# Patient Record
Sex: MALE | Race: WHITE | Employment: FULL TIME | URBAN - METROPOLITAN AREA
[De-identification: names, ages, dates, MRNs, and addresses within clinical notes are randomized per-mention and may not be internally consistent; named-entity substitution may affect disease eponyms.]

---

## 2019-06-23 ENCOUNTER — HOSPITAL ENCOUNTER (EMERGENCY)
Facility: CLINIC | Age: 54
Discharge: HOME | End: 2019-06-23
Attending: EMERGENCY MEDICINE | Admitting: EMERGENCY MEDICINE
Payer: COMMERCIAL

## 2019-06-23 ENCOUNTER — APPOINTMENT (OUTPATIENT)
Dept: CT IMAGING | Facility: CLINIC | Age: 54
End: 2019-06-23
Attending: EMERGENCY MEDICINE
Payer: COMMERCIAL

## 2019-06-23 ENCOUNTER — APPOINTMENT (OUTPATIENT)
Dept: GENERAL RADIOLOGY | Facility: CLINIC | Age: 54
End: 2019-06-23
Attending: EMERGENCY MEDICINE
Payer: COMMERCIAL

## 2019-06-23 VITALS
HEART RATE: 71 BPM | OXYGEN SATURATION: 96 % | WEIGHT: 203.48 LBS | RESPIRATION RATE: 18 BRPM | TEMPERATURE: 98.5 F | BODY MASS INDEX: 28.38 KG/M2 | SYSTOLIC BLOOD PRESSURE: 129 MMHG | DIASTOLIC BLOOD PRESSURE: 86 MMHG

## 2019-06-23 DIAGNOSIS — R06.02 SOB (SHORTNESS OF BREATH): ICD-10-CM

## 2019-06-23 LAB
ANION GAP SERPL CALCULATED.3IONS-SCNC: 5 MMOL/L (ref 3–14)
BASOPHILS # BLD AUTO: 0 10E9/L (ref 0–0.2)
BASOPHILS NFR BLD AUTO: 0.7 %
BUN SERPL-MCNC: 23 MG/DL (ref 7–30)
CALCIUM SERPL-MCNC: 9 MG/DL (ref 8.5–10.1)
CHLORIDE SERPL-SCNC: 107 MMOL/L (ref 94–109)
CO2 BLD-SCNC: 26 MMOL/L (ref 21–28)
CO2 SERPL-SCNC: 27 MMOL/L (ref 20–32)
CREAT SERPL-MCNC: 0.85 MG/DL (ref 0.66–1.25)
D DIMER PPP FEU-MCNC: 0.6 UG/ML FEU (ref 0–0.5)
DIFFERENTIAL METHOD BLD: NORMAL
EOSINOPHIL # BLD AUTO: 0.1 10E9/L (ref 0–0.7)
EOSINOPHIL NFR BLD AUTO: 2.4 %
ERYTHROCYTE [DISTWIDTH] IN BLOOD BY AUTOMATED COUNT: 12.2 % (ref 10–15)
GFR SERPL CREATININE-BSD FRML MDRD: >90 ML/MIN/{1.73_M2}
GLUCOSE SERPL-MCNC: 112 MG/DL (ref 70–99)
HCT VFR BLD AUTO: 43.2 % (ref 40–53)
HGB BLD-MCNC: 14.5 G/DL (ref 13.3–17.7)
IMM GRANULOCYTES # BLD: 0 10E9/L (ref 0–0.4)
IMM GRANULOCYTES NFR BLD: 0.2 %
LACTATE BLD-SCNC: 1 MMOL/L (ref 0.7–2.1)
LYMPHOCYTES # BLD AUTO: 1.6 10E9/L (ref 0.8–5.3)
LYMPHOCYTES NFR BLD AUTO: 27.6 %
MAGNESIUM SERPL-MCNC: 1.9 MG/DL (ref 1.6–2.3)
MCH RBC QN AUTO: 30 PG (ref 26.5–33)
MCHC RBC AUTO-ENTMCNC: 33.6 G/DL (ref 31.5–36.5)
MCV RBC AUTO: 89 FL (ref 78–100)
MONOCYTES # BLD AUTO: 0.4 10E9/L (ref 0–1.3)
MONOCYTES NFR BLD AUTO: 6.9 %
NEUTROPHILS # BLD AUTO: 3.6 10E9/L (ref 1.6–8.3)
NEUTROPHILS NFR BLD AUTO: 62.2 %
NRBC # BLD AUTO: 0 10*3/UL
NRBC BLD AUTO-RTO: 0 /100
NT-PROBNP SERPL-MCNC: 17 PG/ML (ref 0–900)
PCO2 BLD: 43 MM HG (ref 35–45)
PH BLD: 7.38 PH (ref 7.35–7.45)
PLATELET # BLD AUTO: 204 10E9/L (ref 150–450)
PO2 BLD: 38 MM HG (ref 80–105)
POTASSIUM SERPL-SCNC: 4 MMOL/L (ref 3.4–5.3)
RBC # BLD AUTO: 4.84 10E12/L (ref 4.4–5.9)
SAO2 % BLDA FROM PO2: 70 % (ref 92–100)
SODIUM SERPL-SCNC: 139 MMOL/L (ref 133–144)
TROPONIN I BLD-MCNC: 0.01 UG/L (ref 0–0.08)
TROPONIN I SERPL-MCNC: <0.015 UG/L (ref 0–0.04)
WBC # BLD AUTO: 5.8 10E9/L (ref 4–11)

## 2019-06-23 PROCEDURE — 80048 BASIC METABOLIC PNL TOTAL CA: CPT | Performed by: EMERGENCY MEDICINE

## 2019-06-23 PROCEDURE — 25000132 ZZH RX MED GY IP 250 OP 250 PS 637: Performed by: EMERGENCY MEDICINE

## 2019-06-23 PROCEDURE — 25000128 H RX IP 250 OP 636: Performed by: EMERGENCY MEDICINE

## 2019-06-23 PROCEDURE — 99285 EMERGENCY DEPT VISIT HI MDM: CPT | Mod: 25

## 2019-06-23 PROCEDURE — 83735 ASSAY OF MAGNESIUM: CPT | Performed by: EMERGENCY MEDICINE

## 2019-06-23 PROCEDURE — 84484 ASSAY OF TROPONIN QUANT: CPT

## 2019-06-23 PROCEDURE — 83880 ASSAY OF NATRIURETIC PEPTIDE: CPT | Performed by: EMERGENCY MEDICINE

## 2019-06-23 PROCEDURE — 71046 X-RAY EXAM CHEST 2 VIEWS: CPT

## 2019-06-23 PROCEDURE — 85379 FIBRIN DEGRADATION QUANT: CPT | Performed by: EMERGENCY MEDICINE

## 2019-06-23 PROCEDURE — 83605 ASSAY OF LACTIC ACID: CPT

## 2019-06-23 PROCEDURE — 84484 ASSAY OF TROPONIN QUANT: CPT | Performed by: EMERGENCY MEDICINE

## 2019-06-23 PROCEDURE — 85025 COMPLETE CBC W/AUTO DIFF WBC: CPT | Performed by: EMERGENCY MEDICINE

## 2019-06-23 PROCEDURE — 93005 ELECTROCARDIOGRAM TRACING: CPT

## 2019-06-23 PROCEDURE — 71260 CT THORAX DX C+: CPT

## 2019-06-23 PROCEDURE — 82803 BLOOD GASES ANY COMBINATION: CPT

## 2019-06-23 RX ORDER — IOPAMIDOL 755 MG/ML
500 INJECTION, SOLUTION INTRAVASCULAR ONCE
Status: COMPLETED | OUTPATIENT
Start: 2019-06-23 | End: 2019-06-23

## 2019-06-23 RX ORDER — ASPIRIN 81 MG/1
324 TABLET, CHEWABLE ORAL ONCE
Status: COMPLETED | OUTPATIENT
Start: 2019-06-23 | End: 2019-06-23

## 2019-06-23 RX ADMIN — IOPAMIDOL 74 ML: 755 INJECTION, SOLUTION INTRAVENOUS at 14:42

## 2019-06-23 RX ADMIN — ASPIRIN 81 MG 243 MG: 81 TABLET ORAL at 13:43

## 2019-06-23 RX ADMIN — SODIUM CHLORIDE 87 ML: 9 INJECTION, SOLUTION INTRAVENOUS at 14:42

## 2019-06-23 ASSESSMENT — ENCOUNTER SYMPTOMS
SHORTNESS OF BREATH: 1
WEAKNESS: 0
NUMBNESS: 0
COUGH: 0
LIGHT-HEADEDNESS: 0

## 2019-06-23 NOTE — ED NOTES
Patient resting and awaiting results. He no longer is having the feeling of needing to focus on his breathing.

## 2019-06-23 NOTE — ED AVS SNAPSHOT
[facility] Emergency Department  [address]  [facility] [address]  Phone:  [phone]  Fax:  [phone]                                    Kings Chou   MRN: [id number]    Department:  [facility] Emergency Department   Date of Visit:  6/23/2019           After Visit Summary Signature Page    I have received my discharge instructions, and my questions have been answered. I have discussed any challenges I see with this plan with the nurse or doctor.    ..........................................................................................................................................  Patient/Patient Representative Signature      ..........................................................................................................................................  Patient Representative Print Name and Relationship to Patient    ..................................................               ................................................  Date                                   Time    ..........................................................................................................................................  Reviewed by Signature/Title    ...................................................              ..............................................  Date                                               Time          22EPIC Rev 08/18

## 2019-06-23 NOTE — ED PROVIDER NOTES
"  History     Chief Complaint:  Shortness of Breath    HPI:   The history is provided by the patient.      Kings Chou is a 54 year old male with history of type 2 diabetes and hypertension who presents with shortness of breath. The patient states that today he had an onset of shortness of breath which he describes as feeling as if he cannot control his \"rhythm of breathing\". The patient states his breathing feels as if he is \"claustrophobic or anxious\". The patient denies a history of anxiety. He denies chest pain with this shortness of breath. He denies exacerbation with movement. 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 The patient denies history of PE/DVT. He denies recent surgery or history of cancer. He notes that he did take a 6 hour road trip about 1 week ago. He denies change in appetite or abnormal bowel/bladder symptoms. The patient states he regularly exercises and practices martial arts, and he has not noticed shortness of breath with this. The patient notes that his left elbow has been somewhat sore since doing some yard work a few weeks ago, but this is not worse today.     The patient has had 1 prior EKG 3 years ago, which was unremarkable.       Allergies:  No known drug allergies      Medications:    Atorvastatin  Atarax  Lisinopril   Metformin   Senna-docusate   Aspirin     Past Medical History:    Type 2 diabetes   Hypertension     Past Surgical History:    Orthopedic ankle surgery     Family History:    History reviewed. No pertinent family history.      Social History:  The patient is accompanied to the ED by wife.   PCP: Joel Paulino [facility]  Marital Status:    Smoking status: Never smoker  Alcohol use: Positive, 1 beer a week or less       Review of Systems   Respiratory: Positive for shortness of breath. Negative for cough.    Cardiovascular: Negative for chest pain. "   Neurological: Negative for weakness, light-headedness and numbness.   All other systems reviewed and are negative.    Physical Exam     Patient Vitals for the past 24 hrs:   BP Temp Temp src Pulse Heart Rate Resp SpO2 Weight   06/23/19 1530 129/86 -- -- 71 -- -- 96 % --   06/23/19 1430 132/88 -- -- 74 -- -- 95 % --   06/23/19 1415 130/74 -- -- 76 -- -- 95 % --   06/23/19 1330 142/88 -- -- 79 -- -- 96 % --   06/23/19 1224 (!) 178/108 98.5  F (36.9  C) Temporal -- 91 18 100 % 92.3 kg (203 lb 7.8 oz)        Physical Exam  General: The patient is sitting on gurney, appears comfortable.   HEENT:   The scalp and head appear normal    The pupils are equal, round, and reactive to light    Extraocular muscles are intact.    The nose is normal.    The oropharynx is normal.      Uvula is in the midline.    Neck:  Normal range of motion.    Lungs:  Clear.      No rales, no wheezing.      There is no tachypnea.  Non-labored.  Cardiac: Regular rate.      Normal S1 and S2.      No pathological murmur/rub    Abdomen: Soft. No distension, no localized tenderness or rebound.  MS:  Tenderness over left lateral epicondyl. Normal tone. Normal movement of all extremities.   Neurologic:     Normal mentation.  No cranial nerve deficits.  No focal motor or sensory changes.      Speech normal.  Psych:  Awake.     Alert.      Normal affect.      Appropriate interactions.  Skin:  No rash.      No lesions.    Emergency Department Course     ECG (13:19:16):  Rate 75 bpm. DE interval 170. QRS duration 90. QT/QTc 380/424. P-R-T axes 20 8 36.   Normal sinus rhythm   Normal ECG  Interpreted by Enrico Vargas MD.     Imaging:  Radiographic findings were communicated with the patient who voiced understanding of the findings.    XR Chest 2 Views  Impression: Unremarkable chest.  As read by Radiology.     Laboratory:   Troponin POCT: 0.01   Troponin I: <0.015  (mistakenly labeled as arterial) ISTAT gases lactate andrzej POCT: pO2 38, O2 sat 70  CBC: WNL  (WBC 5.8, HGB 14.5, )   BMP: glucose 112, o/w WNL (Creatinine 0.85)  Magnesium: 1.9  D dimer: 0.6  BNP: 17    Interventions:  1343: aspirin 243 mg, PO     Emergency Department Course:  Past medical records, nursing notes, and vitals reviewed.  1258: I performed an exam of the patient and obtained history, as documented above.  IV inserted and blood drawn for laboratory testing. Results are as above.   The patient was sent for X-ray and CT imaging while in the emergency department, findings above.       1410: I rechecked the patient. Explained findings to the patient.     I rechecked the patient. Findings and plan explained to the Patient. Patient discharged home with instructions regarding supportive care, medications, and reasons to return. The importance of close follow-up was reviewed.      Impression & Plan      Medical Decision Making:  Kings Chou is a 54 year old male with history of type 2 diabetes and hyperlipidemia who presents with a sensation of shortness of breath. Evaluation was undertaken here. He has risk factors including travel for DVT/PE and had an elevated D dimer. Ultimately, a CT of the chest was done which was completely unremarkable. All of the rest of his labs were unremarkable as well including heart enzymes. His hemoglobin is normal. Magnesium is normal. Two troponins 2 hours apart were undetectable. He has a normal metabolic profile. At this point I think he can be safely discharged home. He also had a non-diagnostic normal ECG. I have instructed him to follow up with his PCP in 48 hours if his symptoms continue or recur. He should return here in the interim if he develops fever or worsening shortness of breath     Critical Care time:  none    Diagnosis:    ICD-10-CM    1. SOB (shortness of breath) R06.02        Disposition:  discharged to home    Discharge Medications:     Medication List      There are no discharge medications for this visit.       I, Megan Beh, am  serving as a scribe at 12:58 PM on 6/23/2019 to document services personally performed by Enrico Vargas MD based on my observations and the provider's statements to me.      Megan Beh  6/23/2019   [facility] EMERGENCY DEPARTMENT       Enrico Vargas MD  06/23/19 [id number]

## 2019-06-23 NOTE — ED TRIAGE NOTES
"Pt presents to ED for evaluation of SOB that started this morning.  Pt states \"it likes I really have to focus on my breathing otherwise I start to feel claustrophobic and like I'm going to stop breathing.\"  Pt denies chest pain, denies lung problems, appears very anxious.   "

## 2019-06-24 LAB — INTERPRETATION ECG - MUSE: NORMAL

## 2019-10-02 ENCOUNTER — HEALTH MAINTENANCE LETTER (OUTPATIENT)
Age: 54
End: 2019-10-02

## 2021-01-15 ENCOUNTER — HEALTH MAINTENANCE LETTER (OUTPATIENT)
Age: 56
End: 2021-01-15

## 2021-09-04 ENCOUNTER — HEALTH MAINTENANCE LETTER (OUTPATIENT)
Age: 56
End: 2021-09-04

## 2022-02-19 ENCOUNTER — HEALTH MAINTENANCE LETTER (OUTPATIENT)
Age: 57
End: 2022-02-19

## 2022-10-22 ENCOUNTER — HEALTH MAINTENANCE LETTER (OUTPATIENT)
Age: 57
End: 2022-10-22

## 2023-04-01 ENCOUNTER — HEALTH MAINTENANCE LETTER (OUTPATIENT)
Age: 58
End: 2023-04-01